# Patient Record
Sex: MALE | Race: WHITE | NOT HISPANIC OR LATINO | Employment: FULL TIME | URBAN - METROPOLITAN AREA
[De-identification: names, ages, dates, MRNs, and addresses within clinical notes are randomized per-mention and may not be internally consistent; named-entity substitution may affect disease eponyms.]

---

## 2019-11-06 ENCOUNTER — OFFICE VISIT (OUTPATIENT)
Dept: FAMILY MEDICINE | Facility: CLINIC | Age: 39
End: 2019-11-06
Payer: COMMERCIAL

## 2019-11-06 DIAGNOSIS — M79.671 PAIN IN BOTH FEET: ICD-10-CM

## 2019-11-06 DIAGNOSIS — G89.29 CHRONIC MIDLINE LOW BACK PAIN WITH SCIATICA, SCIATICA LATERALITY UNSPECIFIED: Primary | ICD-10-CM

## 2019-11-06 DIAGNOSIS — Z13.89 SCREENING FOR BLOOD OR PROTEIN IN URINE: ICD-10-CM

## 2019-11-06 DIAGNOSIS — Z13.6 SCREENING FOR CARDIOVASCULAR CONDITION: ICD-10-CM

## 2019-11-06 DIAGNOSIS — M79.672 PAIN IN BOTH FEET: ICD-10-CM

## 2019-11-06 DIAGNOSIS — M54.40 CHRONIC MIDLINE LOW BACK PAIN WITH SCIATICA, SCIATICA LATERALITY UNSPECIFIED: Primary | ICD-10-CM

## 2019-11-06 DIAGNOSIS — Z13.29 SCREENING FOR ENDOCRINE DISORDER: ICD-10-CM

## 2019-11-06 PROCEDURE — 99213 PR OFFICE/OUTPT VISIT, EST, LEVL III, 20-29 MIN: ICD-10-PCS | Mod: S$GLB,,, | Performed by: FAMILY MEDICINE

## 2019-11-06 PROCEDURE — 99213 OFFICE O/P EST LOW 20 MIN: CPT | Mod: S$GLB,,, | Performed by: FAMILY MEDICINE

## 2019-11-06 RX ORDER — METHOCARBAMOL 750 MG/1
750 TABLET, FILM COATED ORAL NIGHTLY
Qty: 30 TABLET | Refills: 5 | Status: SHIPPED | OUTPATIENT
Start: 2019-11-06 | End: 2019-12-06

## 2019-11-06 RX ORDER — NABUMETONE 500 MG/1
500 TABLET, FILM COATED ORAL 2 TIMES DAILY
Qty: 60 TABLET | Refills: 5 | Status: SHIPPED | OUTPATIENT
Start: 2019-11-06 | End: 2019-12-06

## 2019-11-06 RX ORDER — METHOCARBAMOL 750 MG/1
500 TABLET, FILM COATED ORAL NIGHTLY
COMMUNITY
End: 2019-11-06 | Stop reason: SDUPTHER

## 2019-11-06 RX ORDER — NABUMETONE 500 MG/1
500 TABLET, FILM COATED ORAL 2 TIMES DAILY
COMMUNITY
End: 2019-11-06 | Stop reason: SDUPTHER

## 2019-11-06 NOTE — PROGRESS NOTES
SUBJECTIVE:    Patient ID: Shakeel Schwarz is a 39 y.o. male.    Chief Complaint: Back Pain (check up)    Pt here to checkup on chronic pain in the back and feet.     Cont to have feet pain. Stretches them a lot.  Has back pain and sometimes afraid to move. Does some stretching before he gets out of bed.     Continues to take robaxin and relafen.  Pt never did see Rheum in reference to pains.  Has seen Dr. Bacon at [facility] in the past that wants to r/o C-M-T disease.      No visits with results within 6 Month(s) from this visit.   Latest known visit with results is:   No results found for any previous visit.       History reviewed. No pertinent past medical history.  Past Surgical History:   Procedure Laterality Date    cyst r cheek      VASECTOMY       Family History   Problem Relation Age of Onset    Heart disease Father        Marital Status:   Alcohol History:  reports that he drinks alcohol.  Tobacco History:  reports that he has never smoked. He has never used smokeless tobacco.  Drug History:  reports that he does not use drugs.    Review of patient's allergies indicates:  No Known Allergies    Current Outpatient Medications:     methocarbamol (ROBAXIN) 750 MG Tab, Take 1 tablet (750 mg total) by mouth every evening., Disp: 30 tablet, Rfl: 5    nabumetone (RELAFEN) 500 MG tablet, Take 1 tablet (500 mg total) by mouth 2 (two) times daily., Disp: 60 tablet, Rfl: 5    Review of Systems   Constitutional: Negative for appetite change, fatigue, fever and unexpected weight change.   Respiratory: Negative for cough, shortness of breath and wheezing.    Cardiovascular: Negative for chest pain and leg swelling.   Gastrointestinal: Negative for abdominal pain, constipation, nausea and vomiting.   Genitourinary: Negative for decreased urine volume, difficulty urinating, dysuria and frequency.   Musculoskeletal: Negative for back pain, myalgias and neck pain.   Skin: Negative for rash.   Neurological:  "Negative for weakness, numbness and headaches.   Hematological: Does not bruise/bleed easily.   Psychiatric/Behavioral: Negative for behavioral problems, sleep disturbance and suicidal ideas. The patient is not nervous/anxious.           Objective:      Vitals:    11/06/19 1504   BP: (P) 106/68   Pulse: (P) 64   SpO2: (P) 96%   Weight: (P) 61.5 kg (135 lb 9.6 oz)   Height: (P) 5' 6.5" (1.689 m)     Body mass index is 21.56 kg/m² (pended).  Physical Exam   Constitutional: He is oriented to person, place, and time. He appears well-developed and well-nourished.   HENT:   Head: Normocephalic and atraumatic.   Neck: Neck supple. No thyromegaly present.   Cardiovascular: Normal rate and regular rhythm. Exam reveals no friction rub.   No murmur heard.  Pulmonary/Chest: Effort normal and breath sounds normal. He has no wheezes. He has no rales.   Abdominal: Soft. Bowel sounds are normal. He exhibits no distension. There is no tenderness.   Lymphadenopathy:     He has no cervical adenopathy.   Neurological: He is alert and oriented to person, place, and time.   Skin: Skin is warm and dry. No rash noted.   Psychiatric: He has a normal mood and affect. His speech is normal. Judgment and thought content normal.   Vitals reviewed.        Assessment:       1. Chronic midline low back pain with sciatica, sciatica laterality unspecified    2. Pain in both feet    3. Screening for blood or protein in urine    4. Screening for endocrine disorder    5. Screening for cardiovascular condition         Plan:       Chronic midline low back pain with sciatica, sciatica laterality unspecified  Comments:  Controlled. Though considering going to a chiropractor. Recommended yoga or Rheum.  Will continue to monitor.  Orders:  -     nabumetone (RELAFEN) 500 MG tablet; Take 1 tablet (500 mg total) by mouth 2 (two) times daily.  Dispense: 60 tablet; Refill: 5  -     methocarbamol (ROBAXIN) 750 MG Tab; Take 1 tablet (750 mg total) by mouth every " evening.  Dispense: 30 tablet; Refill: 5    Pain in both feet  Comments:  Has not seen a podiatrist. May find benefit. Has seen Ortho in the past without resolution.    Screening for blood or protein in urine  -     Urinalysis; Future; Expected date: 11/06/2019    Screening for endocrine disorder  -     TSH w/reflex to FT4; Future; Expected date: 11/06/2019    Screening for cardiovascular condition  -     Comprehensive metabolic panel; Future; Expected date: 11/06/2019  -     Lipid panel; Future; Expected date: 11/06/2019  -     CBC auto differential; Future; Expected date: 11/06/2019    Labs have been ordered for monitoring of chronic conditions, just before next visit.    Follow up in about 6 months (around 5/6/2020) for Chronic back and foot Pain.

## 2020-04-30 ENCOUNTER — TELEPHONE (OUTPATIENT)
Dept: FAMILY MEDICINE | Facility: CLINIC | Age: 40
End: 2020-04-30

## 2022-04-14 ENCOUNTER — HOSPITAL ENCOUNTER (EMERGENCY)
Facility: HOSPITAL | Age: 42
Discharge: HOME | End: 2022-04-15
Attending: EMERGENCY MEDICINE
Payer: COMMERCIAL

## 2022-04-14 DIAGNOSIS — S61.214A LACERATION OF RIGHT RING FINGER WITHOUT FOREIGN BODY WITHOUT DAMAGE TO NAIL, INITIAL ENCOUNTER: Primary | ICD-10-CM

## 2022-04-14 PROCEDURE — 25000003 PHARM REV CODE 250: Performed by: PHYSICIAN ASSISTANT

## 2022-04-14 PROCEDURE — 12001 RPR S/N/AX/GEN/TRNK 2.5CM/<: CPT

## 2022-04-14 PROCEDURE — 99284 EMERGENCY DEPT VISIT MOD MDM: CPT | Mod: 25

## 2022-04-14 RX ORDER — CEPHALEXIN 500 MG/1
500 CAPSULE ORAL
Status: COMPLETED | OUTPATIENT
Start: 2022-04-14 | End: 2022-04-15

## 2022-04-14 RX ORDER — CEPHALEXIN 500 MG/1
500 CAPSULE ORAL 4 TIMES DAILY
Qty: 20 CAPSULE | Refills: 0 | Status: SHIPPED | OUTPATIENT
Start: 2022-04-14 | End: 2022-04-19

## 2022-04-14 RX ORDER — LIDOCAINE HYDROCHLORIDE 10 MG/ML
10 INJECTION INFILTRATION; PERINEURAL
Status: COMPLETED | OUTPATIENT
Start: 2022-04-14 | End: 2022-04-14

## 2022-04-14 RX ADMIN — LIDOCAINE HYDROCHLORIDE 10 ML: 10 INJECTION, SOLUTION INFILTRATION; PERINEURAL at 11:04

## 2022-04-14 RX ADMIN — BACITRACIN, NEOMYCIN, POLYMYXIN B 1 EACH: 400; 3.5; 5 OINTMENT TOPICAL at 11:04

## 2022-04-14 RX ADMIN — LIDOCAINE-EPINEPHRINE-TETRACAINE GEL 4-0.05-0.5%: 4-0.05-0.5 GEL at 10:04

## 2022-04-15 VITALS
WEIGHT: 150 LBS | TEMPERATURE: 98 F | HEART RATE: 55 BPM | SYSTOLIC BLOOD PRESSURE: 126 MMHG | DIASTOLIC BLOOD PRESSURE: 78 MMHG | RESPIRATION RATE: 17 BRPM | HEIGHT: 67 IN | OXYGEN SATURATION: 100 % | BODY MASS INDEX: 23.54 KG/M2

## 2022-04-15 PROCEDURE — 25000003 PHARM REV CODE 250: Performed by: PHYSICIAN ASSISTANT

## 2022-04-15 RX ADMIN — CEPHALEXIN 500 MG: 500 CAPSULE ORAL at 12:04

## 2022-04-15 NOTE — ED NOTES
Bed: 28B  Expected date: 4/14/22  Expected time: 9:25 PM  Means of arrival: Personal Transportation  Comments:

## 2022-04-15 NOTE — DISCHARGE INSTRUCTIONS
"Keep current dressing in place for 24 hours.  After that, clean the wound gently with soap and water once or twice daily, apply topical antibiotics to the wound with each dressing change.  Keep the area dry.  Keep the wound covered for the first 3-4 days, after that you can "air the wound out".  Tylenol or ibuprofen as needed for pain.  Take all antibiotics as prescribed, try to take with meals to limit nausea.    Sutures need to be removed in 12-14 days.    Follow-up with primary care provider for suture removal wound re-evaluation.  Establish care with Hand surgery should you experience persistent pain or numbness.    Return to this ED if wound becomes red and warm despite 48 hours of antibiotics, if area begins to drain foul-smelling fluid, if unable to tolerate pain, if any other problems occur.  "

## 2022-04-15 NOTE — ED TRIAGE NOTES
Pt presents to ED via personal transportation c/o right hand pain to the 4th digit secondary to a laceration obtain while working on a vehicle.  Pt reports accidentally cut himself with a blade.  Associated symptom is numbness.  Bleeding is controlled.  Denies n/v or dizziness.

## 2022-04-15 NOTE — ED PROVIDER NOTES
"Encounter Date: 4/14/2022       History     Chief Complaint   Patient presents with    Laceration     Pt presents to ED secondary to right hand 4th digit laceration. States reached into a toolbox and cut finger on a "dirty razor." Reports tetanus UTD. Bleeding controlled at this time. Reports numbness to tip of finger.      41-year-old male with chief complaint right ring finger laceration sustained just prior to arrival.    Patient was reaching into a 2 months, accidentally cut himself on exposed blade.  Admits to moderate amount of bleeding from the wound, improved with pressure dressing.  Denies lightheadedness or near syncope.  He admits to numbness to the tuft of the finger following the injury.  He is left-hand dominant.  Symptoms are acute, constant moderate.  No alleviating factors.  No radiation symptoms.  Pain exacerbated palpation.  No reduced range of motion of the finger joints, no joint swelling or obvious involvement.  No foreign bodies.        Review of patient's allergies indicates:  No Known Allergies  History reviewed. No pertinent past medical history.  Past Surgical History:   Procedure Laterality Date    cyst r cheek      VASECTOMY       Family History   Problem Relation Age of Onset    Heart disease Father      Social History     Tobacco Use    Smoking status: Current Every Day Smoker     Types: Vaping with nicotine    Smokeless tobacco: Never Used   Substance Use Topics    Alcohol use: Yes     Comment: socially    Drug use: Yes     Types: Marijuana     Review of Systems   Musculoskeletal: Negative for arthralgias and joint swelling.   Skin: Positive for wound.   Neurological: Positive for numbness. Negative for weakness.       Physical Exam     Initial Vitals [04/14/22 2059]   BP Pulse Resp Temp SpO2   135/71 72 18 98.1 °F (36.7 °C) 100 %      MAP       --         Physical Exam    Nursing note and vitals reviewed.  Constitutional: He appears well-developed and well-nourished. He is " not diaphoretic. No distress.   HENT:   Head: Normocephalic and atraumatic.   Neck: Neck supple.   Pulmonary/Chest: No respiratory distress.   Musculoskeletal:      Cervical back: Neck supple.      Comments: R hand: 4th digit with laceration to distal phalanx, volar aspect, extends from ulnar aspect to tuft, volar to distal nail. No uncontrolled bleeding. No visualized bone. Full ROM DIP. 2s cap refill.  Reported mild loss of sensation to the ulnar aspect of the tuft distal to the laceration.     Neurological: He is alert and oriented to person, place, and time. GCS score is 15. GCS eye subscore is 4. GCS verbal subscore is 5. GCS motor subscore is 6.   Skin: Skin is warm. Capillary refill takes less than 2 seconds.   Psychiatric: He has a normal mood and affect. Thought content normal.         ED Course   Lac Repair    Date/Time: 4/14/2022 11:47 PM  Performed by: Ranjit Juarez PA-C  Authorized by: Blaise Cote MD     Consent:     Consent obtained:  Verbal    Consent given by:  Patient    Risks discussed:  Poor cosmetic result and poor wound healing  Universal protocol:     Patient identity confirmed:  Verbally with patient  Anesthesia:     Anesthesia method:  Topical application and local infiltration    Topical anesthetic:  LET    Local anesthetic:  Lidocaine 1% w/o epi  Laceration details:     Location:  Finger    Finger location:  R ring finger    Length (cm):  2.5  Pre-procedure details:     Preparation:  Patient was prepped and draped in usual sterile fashion  Exploration:     Limited defect created (wound extended): no      Hemostasis achieved with:  Direct pressure    Wound exploration: wound explored through full range of motion and entire depth of wound visualized      Wound extent: nerve damage      Wound extent: no foreign bodies/material noted, no underlying fracture noted and no vascular damage noted    Treatment:     Area cleansed with:  Saline    Amount of cleaning:  Extensive     Irrigation solution:  Sterile saline    Irrigation volume:  300mL    Irrigation method:  Pressure wash  Skin repair:     Repair method:  Sutures    Suture size:  4-0    Suture material:  Nylon    Suture technique:  Simple interrupted    Number of sutures:  6  Approximation:     Approximation:  Loose  Repair type:     Repair type:  Simple  Post-procedure details:     Dressing:  Antibiotic ointment and bulky dressing    Procedure completion:  Tolerated well, no immediate complications      Labs Reviewed - No data to display       Imaging Results    None          Medications   LETS (LIDOcaine-TETRAcaine-EPINEPHrine) gel solution ( Topical (Top) Given 4/14/22 2244)   LIDOcaine HCL 10 mg/ml (1%) injection 10 mL (10 mLs Infiltration Given by Other 4/14/22 2334)   neomycin-bacitracnZn-polymyxnB packet (1 each Topical (Top) Given by Other 4/14/22 2334)   cephALEXin capsule 500 mg (500 mg Oral Given 4/15/22 0001)     Medical Decision Making:   Differential Diagnosis:   Infected wound, open fracture, laceration  ED Management:  Patient states tetanus is up today.  Given modality of the laceration, he works with his hands, dirt grime underneath the nails of all his digits, will start on systemic antibiotics to prevent infection.  Denies history of DM, denies history of any difficulty with wound healing.  Referral to Hand surgery given complaint of numbness to tuft.  Likely superficial nerve injury.  Retains full active range of motion.  Good cap refill.  Advised to stop smoking.  Return precautions given.                      Clinical Impression:   Final diagnoses:  [A08.674U] Laceration of right ring finger without foreign body without damage to nail, initial encounter (Primary)          ED Disposition Condition    Discharge Stable        ED Prescriptions     Medication Sig Dispense Start Date End Date Auth. Provider    cephALEXin (KEFLEX) 500 MG capsule Take 1 capsule (500 mg total) by mouth 4 (four) times daily. for 5 days  20 capsule 4/14/2022 4/19/2022 Ranjit Juarez PA-C        Follow-up Information     Follow up With Specialties Details Why Contact Info    Ras Negron MD Family Medicine Schedule an appointment as soon as possible for a visit  For wound re-check, For reevaluation, For suture removal [address]  [address]  [facility] [address]  [phone]      Leanna Cuevas III, MD Orthopedic Surgery Schedule an appointment as soon as possible for a visit  For reevaluation, For wound re-check [address]  [address]  [facility] [address]  [phone]             Ranjit Juarez PA-C  04/15/22 0500

## 2022-04-15 NOTE — FIRST PROVIDER EVALUATION
" Emergency Department TeleTriage Encounter Note      CHIEF COMPLAINT    Chief Complaint   Patient presents with    Laceration     Pt presents to ED secondary to right hand 4th digit laceration. States reached into a toolbox and cut finger on a "dirty razor." Reports tetanus UTD. Bleeding controlled at this time. Reports numbness to tip of finger.        VITAL SIGNS   Initial Vitals [04/14/22 2059]   BP Pulse Resp Temp SpO2   135/71 72 18 98.1 °F (36.7 °C) 100 %      MAP       --            ALLERGIES    Review of patient's allergies indicates:  No Known Allergies    PROVIDER TRIAGE NOTE  TeleTriage Note: Shakeel Schwarz, a nontoxic/well appearing, 41 y.o. male, presented to the ED with c/o accidentally cut his right 4th finger on a razor blade that was in a tool box. Unknown last date of tetanus. Bleeding controlled. Per LINKS, last tetanus was 2018.    All ED beds are full at present; patient notified of this status.  Patient seen and medically screened by Nurse Practitioner via teletriage. Patient is stable to return to the waiting room and will be placed in an ED bed when available.  Care will be transferred to an alternate provider when patient has been placed in an Exam Room from the [facility] for physical exam, additional orders, and disposition.  9:04 PM Catherine Cash DNP, FNP-C        ORDERS  Labs Reviewed - No data to display    ED Orders (720h ago, onward)    None            Virtual Visit Note: The provider triage portion of this emergency department evaluation and documentation was performed via [employer], a HIPAA-compliant telemedicine application, in concert with a tele-presenter in the room. A face to face patient evaluation with one of my colleagues will occur once the patient is placed in an emergency department room.      DISCLAIMER: This note was prepared with [employer] voice recognition transcription software. Garbled syntax, mangled pronouns, and other bizarre constructions may be attributed to " that software system.

## 2022-07-28 ENCOUNTER — OCCUPATIONAL HEALTH (OUTPATIENT)
Dept: URGENT CARE | Facility: CLINIC | Age: 42
End: 2022-07-28

## 2022-07-28 DIAGNOSIS — Z13.9 ENCOUNTER FOR SCREENING: Primary | ICD-10-CM

## 2022-07-28 PROCEDURE — 94010 PULMONARY FUNCTION SCREENING (OCC MED PHYSICALS): ICD-10-PCS | Mod: S$GLB,,,

## 2022-07-28 PROCEDURE — 99002 DEVICE HANDLING PHYS/QHP: CPT | Mod: S$GLB,,,

## 2022-07-28 PROCEDURE — 84202 LEAD STANDARD PROFILE, BLOOD: ICD-10-PCS | Mod: S$GLB,,,

## 2022-07-28 PROCEDURE — 94010 BREATHING CAPACITY TEST: CPT | Mod: S$GLB,,,

## 2022-07-28 PROCEDURE — 99002 MASK FIT-QUANTITATIVE: ICD-10-PCS | Mod: S$GLB,,,

## 2022-07-28 PROCEDURE — 99080 OSHA QUESTIONNAIRE: ICD-10-PCS | Mod: S$GLB,,,

## 2022-07-28 PROCEDURE — 84202 ASSAY RBC PROTOPORPHYRIN: CPT | Mod: S$GLB,,,

## 2022-07-28 PROCEDURE — 99080 SPECIAL REPORTS OR FORMS: CPT | Mod: S$GLB,,,

## 2022-09-20 ENCOUNTER — OCCUPATIONAL HEALTH (OUTPATIENT)
Dept: URGENT CARE | Facility: CLINIC | Age: 42
End: 2022-09-20

## 2022-09-20 DIAGNOSIS — Z13.9 ENCOUNTER FOR SCREENING: Primary | ICD-10-CM

## 2022-09-20 PROCEDURE — 84202 LEAD STANDARD PROFILE, BLOOD: ICD-10-PCS | Mod: S$GLB,,,

## 2022-09-20 PROCEDURE — 84202 ASSAY RBC PROTOPORPHYRIN: CPT | Mod: S$GLB,,,

## 2022-10-24 ENCOUNTER — OCCUPATIONAL HEALTH (OUTPATIENT)
Dept: URGENT CARE | Facility: CLINIC | Age: 42
End: 2022-10-24
Payer: COMMERCIAL

## 2022-10-24 DIAGNOSIS — Z13.9 ENCOUNTER FOR SCREENING: Primary | ICD-10-CM

## 2022-10-24 PROCEDURE — 84202 LEAD STANDARD PROFILE, BLOOD: ICD-10-PCS | Mod: S$GLB,,,

## 2022-10-24 PROCEDURE — 84202 ASSAY RBC PROTOPORPHYRIN: CPT | Mod: S$GLB,,,

## 2023-01-23 ENCOUNTER — OCCUPATIONAL HEALTH (OUTPATIENT)
Dept: URGENT CARE | Facility: CLINIC | Age: 43
End: 2023-01-23

## 2023-01-23 DIAGNOSIS — Z13.9 ENCOUNTER FOR SCREENING: Primary | ICD-10-CM

## 2023-01-23 PROCEDURE — 84202 LEAD STANDARD PROFILE, BLOOD: ICD-10-PCS | Mod: S$GLB,,,

## 2023-01-23 PROCEDURE — 84202 ASSAY RBC PROTOPORPHYRIN: CPT | Mod: S$GLB,,,

## 2023-03-31 ENCOUNTER — OCCUPATIONAL HEALTH (OUTPATIENT)
Dept: URGENT CARE | Facility: CLINIC | Age: 43
End: 2023-03-31

## 2023-03-31 DIAGNOSIS — Z13.9 ENCOUNTER FOR SCREENING: Primary | ICD-10-CM

## 2023-03-31 PROCEDURE — 84202 LEAD STANDARD PROFILE, BLOOD: ICD-10-PCS | Mod: S$GLB,,,

## 2023-03-31 PROCEDURE — 84202 ASSAY RBC PROTOPORPHYRIN: CPT | Mod: S$GLB,,,

## 2023-04-26 ENCOUNTER — OCCUPATIONAL HEALTH (OUTPATIENT)
Dept: URGENT CARE | Facility: CLINIC | Age: 43
End: 2023-04-26

## 2023-04-26 DIAGNOSIS — Z13.9 ENCOUNTER FOR SCREENING: Primary | ICD-10-CM

## 2023-04-26 PROCEDURE — 99002 DEVICE HANDLING PHYS/QHP: CPT | Mod: S$GLB,,,

## 2023-04-26 PROCEDURE — 99080 OSHA QUESTIONNAIRE: ICD-10-PCS | Mod: S$GLB,,,

## 2023-04-26 PROCEDURE — 84202 ASSAY RBC PROTOPORPHYRIN: CPT | Mod: S$GLB,,,

## 2023-04-26 PROCEDURE — 99080 SPECIAL REPORTS OR FORMS: CPT | Mod: S$GLB,,,

## 2023-04-26 PROCEDURE — 94010 BREATHING CAPACITY TEST: CPT | Mod: S$GLB,,,

## 2023-04-26 PROCEDURE — 84202 LEAD STANDARD PROFILE, BLOOD: ICD-10-PCS | Mod: S$GLB,,,

## 2023-04-26 PROCEDURE — 94010 PULMONARY FUNCTION SCREENING (OCC MED PHYSICALS): ICD-10-PCS | Mod: S$GLB,,,

## 2023-04-26 PROCEDURE — 99002 MASK FIT-QUANTITATIVE: ICD-10-PCS | Mod: S$GLB,,,

## 2023-08-10 ENCOUNTER — OCCUPATIONAL HEALTH (OUTPATIENT)
Dept: URGENT CARE | Facility: CLINIC | Age: 43
End: 2023-08-10

## 2023-08-10 DIAGNOSIS — Z13.9 ENCOUNTER FOR SCREENING: Primary | ICD-10-CM

## 2023-08-10 PROCEDURE — 84202 LEAD STANDARD PROFILE, BLOOD: ICD-10-PCS | Mod: S$GLB,,, | Performed by: PHYSICIAN ASSISTANT

## 2023-08-10 PROCEDURE — 84202 ASSAY RBC PROTOPORPHYRIN: CPT | Mod: S$GLB,,, | Performed by: PHYSICIAN ASSISTANT
